# Patient Record
Sex: MALE | Race: BLACK OR AFRICAN AMERICAN | NOT HISPANIC OR LATINO
[De-identification: names, ages, dates, MRNs, and addresses within clinical notes are randomized per-mention and may not be internally consistent; named-entity substitution may affect disease eponyms.]

---

## 2023-03-10 ENCOUNTER — APPOINTMENT (OUTPATIENT)
Dept: VASCULAR SURGERY | Facility: CLINIC | Age: 48
End: 2023-03-10
Payer: COMMERCIAL

## 2023-03-10 VITALS — HEIGHT: 73 IN | BODY MASS INDEX: 23.86 KG/M2 | WEIGHT: 180 LBS

## 2023-03-10 DIAGNOSIS — Z72.89 OTHER PROBLEMS RELATED TO LIFESTYLE: ICD-10-CM

## 2023-03-10 DIAGNOSIS — J45.909 UNSPECIFIED ASTHMA, UNCOMPLICATED: ICD-10-CM

## 2023-03-10 DIAGNOSIS — I10 ESSENTIAL (PRIMARY) HYPERTENSION: ICD-10-CM

## 2023-03-10 DIAGNOSIS — Z82.49 FAMILY HISTORY OF ISCHEMIC HEART DISEASE AND OTHER DISEASES OF THE CIRCULATORY SYSTEM: ICD-10-CM

## 2023-03-10 PROBLEM — Z00.00 ENCOUNTER FOR PREVENTIVE HEALTH EXAMINATION: Status: ACTIVE | Noted: 2023-03-10

## 2023-03-10 PROCEDURE — 99204 OFFICE O/P NEW MOD 45 MIN: CPT

## 2023-03-10 RX ORDER — ALBUTEROL SULFATE 90 UG/1
108 (90 BASE) AEROSOL, METERED RESPIRATORY (INHALATION)
Refills: 0 | Status: ACTIVE | COMMUNITY

## 2023-03-10 RX ORDER — RAMIPRIL 2.5 MG/1
2.5 CAPSULE ORAL
Refills: 0 | Status: ACTIVE | COMMUNITY

## 2023-03-10 RX ORDER — MONTELUKAST 10 MG/1
TABLET, FILM COATED ORAL
Refills: 0 | Status: ACTIVE | COMMUNITY

## 2023-03-10 NOTE — ASSESSMENT
[FreeTextEntry1] : 47-year-old male with an incidental finding of a 4 cm ascending aortic aneurysm on coronary CT.  I discussed that ascending aortic aneurysms are managed by cardiothoracic surgery, and as such I have provided him a referral to Dr. Carroll Chance at [facility].  He will make a nonurgent appointment, and I did discuss with him that he will likely require surveillance, not operative repair at this juncture.\par Due to his recent diagnosis he has anxiety about abdominal aortic aneurysm disease.  As such, we may obtain a duplex to rule out AAA.  We will follow-up with me after duplex performed to discuss results as well as management.

## 2023-03-10 NOTE — DATA REVIEWED
[FreeTextEntry1] : Technique: Coronary calcium scoring was performed on a 64-slice cardiac CT\par scanner with prospective ECG gating technique.\par \par Radiation Dose Exposure:  DLP: 81.1 mGy.cm x 0.014 = Effective Dose 1.1 mSv\par \par Score Summary:\par \par Coronary Arteries\par \par Total: 0         \par    \par LM: 0         \par LAD: 0         \par LCx: 0        \par RCA: 0    \par \par Aortic calcium (including aortic sinus): N       \par Aortic valve calcium: N       \par Mitral annular calcium: N             \par Pericardial calcification: N              \par \par \par Non cardiac findings: \par \par The visualized lungs reveal no suspicious abnormalities.\par \par The visualized mediastinum reveals no suspicious abnormalities. There is mild\par fusiform dilatation of ascending thoracic aorta measuring 4 cm in diameter..\par \par \par IMPRESSION: \par \par Cardiac: \par The calcium score is 0 Agatston units.\par \par Non-cardiac: \par \par \par Mild 4 cm fusiform dilatation ascending thoracic aorta. Consider interval\par yearly follow-up surveillance imaging.\par \par \par Cardiovascular Disease (CVD) Risk Score Translation:\par 0: No calcified atherosclerotic plaque; very low CVD risk.\par 1:10: Minimal plaque burden; low CVD risk.\par 11:100: Mild plaque burden; moderate CVD risk.\par 101:400: Moderate plaque burden; high CVD risk.\par Greater than 401: Severe plaque burden; very high CVD risk\par \par Onel Cruz M.D., Attending Cardiologist\par Stevie Erickson M.D., Attending Radiologist\par \par --- End of Report ---\par \par 			Electronically Signed:\par 			____________________________________________\par 			Stevie Erickson MD\par 			02/15/23 1525

## 2023-03-10 NOTE — CONSULT LETTER
[Dear  ___] : Dear  [unfilled], [Consult Letter:] : I had the pleasure of evaluating your patient, [unfilled]. [Please see my note below.] : Please see my note below. [Consult Closing:] : Thank you very much for allowing me to participate in the care of this patient.  If you have any questions, please do not hesitate to contact me. [Sincerely,] : Sincerely, [FreeTextEntry3] : Sundeep Drummond MD, RPVI

## 2023-03-10 NOTE — PHYSICAL EXAM
[2+] : left 2+ [Ankle Swelling (On Exam)] : not present [Varicose Veins Of Lower Extremities] : not present [] : not present [No Rash or Lesion] : No rash or lesion [Alert] : alert [Oriented to Person] : oriented to person [Oriented to Place] : oriented to place [Calm] : calm [de-identified] : NAD [de-identified] : NCAT [de-identified] : Soft, nontender, nondistended [de-identified] : Supple

## 2023-03-10 NOTE — HISTORY OF PRESENT ILLNESS
[FreeTextEntry1] : 47-year-old male referred by YOSHI Kenney, for evaluation of incidental finding on recent coronary CT.\par He was undergoing a prior CT for screening purposes, which revealed a 4 cm ascending aortic aneurysm.  He denies a personal family history of aneurysmal disease or connective tissue disorder.  He is a lifelong non-smoker.\par He is in good health, remains active and exercises frequently.  He denies chest pain, back pain, abdominal pain.

## 2023-03-21 ENCOUNTER — APPOINTMENT (OUTPATIENT)
Dept: CARDIOTHORACIC SURGERY | Facility: CLINIC | Age: 48
End: 2023-03-21
Payer: COMMERCIAL

## 2023-03-21 ENCOUNTER — NON-APPOINTMENT (OUTPATIENT)
Age: 48
End: 2023-03-21

## 2023-03-21 VITALS
BODY MASS INDEX: 23.86 KG/M2 | RESPIRATION RATE: 16 BRPM | TEMPERATURE: 97.8 F | DIASTOLIC BLOOD PRESSURE: 77 MMHG | SYSTOLIC BLOOD PRESSURE: 136 MMHG | HEIGHT: 73 IN | WEIGHT: 180 LBS | HEART RATE: 54 BPM | OXYGEN SATURATION: 99 %

## 2023-03-21 DIAGNOSIS — I71.21 ANEURYSM OF THE ASCENDING AORTA, WITHOUT RUPTURE: ICD-10-CM

## 2023-03-21 DIAGNOSIS — Z82.49 FAMILY HISTORY OF ISCHEMIC HEART DISEASE AND OTHER DISEASES OF THE CIRCULATORY SYSTEM: ICD-10-CM

## 2023-03-21 PROCEDURE — 99204 OFFICE O/P NEW MOD 45 MIN: CPT

## 2023-03-22 PROBLEM — Z82.49 FAMILY HISTORY OF MYOCARDIAL INFARCTION: Status: ACTIVE | Noted: 2023-03-22

## 2023-03-22 PROBLEM — I71.21 ANEURYSM OF ASCENDING AORTA: Status: ACTIVE | Noted: 2023-03-10

## 2023-03-29 NOTE — HISTORY OF PRESENT ILLNESS
[FreeTextEntry1] : 47 year old male with a past medical hx of hypertension, asthma, presents for evaluation and management of ascending aortic aneurysm. \par \par CT calcium score coronary 2/15/2023:\par calcium score is 0 agatston units. \par mild 4 cm fusiform ascending thoracic aorta. \par \par Patient is doing well and denies recent hospitalization, ER visits, or surgeries. He  denies fever, chills, fatigue, headache, blurred vision, dizziness, syncope, chest pain, palpitations, shortness of breath, orthopnea, paroxysmal nocturnal dyspnea, nausea, vomiting, abdominal pain, back pain, BRBPR or swelling to legs. Patient exercises on a regular basis. \par

## 2023-03-29 NOTE — ASSESSMENT
[FreeTextEntry1] : 47 year old male with a past medical hx of hypertension, asthma, presents for evaluation and management of ascending aortic aneurysm. \par \par \par The patient's medical records and diagnostic images were reviewed at the time of this office visit, and the following recommendation was made. Patient does not meet criteria for surgical intervention at the time and will be entered into the aortic registry surveillance program.\par \par Plan\par \par - [facility] with [facility]A chest. \par - Continue medication regimen.\par - Follow up with cardiologist and PCP.\par - Blood pressure management.\par

## 2023-03-29 NOTE — PROCEDURE
[FreeTextEntry1] : Dr. Chance reviewed the indications for surgery, and used our webpage [email or web address] <[email or web address]> to illustrate the aorta and anatomy of the heart. Those indications are the following: size greater than 5.0 cm, symptomatic aneurysms, family history of aortic dissection or aneurysm death with a size greater than 4.5 cm, other necessary cardiac procedures such as coronary artery bypass grafting or valvular disorders with an aneurysm greater than 4.5 cm, or connective tissue disorders with an aneurysm size greater than 4.5 cm. The patient does not meet size criteria for surgical intervention at the time.\par \par Dr. Chance discussed activity restrictions with the patient, and would advise exercise at a moderate amount with no heavy lifting over one third of body weight, and avoiding heart rates that exceed 140 beats per minute. In addition, every patient should abstain from tobacco abuse and to avoid all illicit drug use, especially stimulants such as cocaine or methamphetamine. Dr. Chance also counseled regarding maintaining a healthy heart diet, and losing any excessive weight as this also put undue stress on both the aorta and entire cardiovascular system. First degree family members should be screened for bicuspid valve disease, and ascending aortic aneurysms. \par \par Patient was advised to view the educational video prior to this visit regarding aortic pathology, risk factors, surgical procedures, and lifestyle modifications. Video can be retrieved at [email or web address]/[email or web address]=youtu.be.\par

## 2023-03-29 NOTE — END OF VISIT
[Time Spent: ___ minutes] : I have spent [unfilled] minutes of time on the encounter. [FreeTextEntry3] : \par I, MAYANK JOU , am scribing for and in the presence of BRITTNEY CARTER the following sections: History of present illness, past Medical/family/surgical/family/social history, review of systems, vital signs, physical exam and disposition.\par \par I personally performed the services described in the documentation, reviewed the documentation recorded by the scribe in my presence and it accurately and completely records my words and actions.\par \par

## 2023-03-29 NOTE — CONSULT LETTER
[Dear  ___] : Dear  [unfilled], [FreeTextEntry2] : Sundeep Drummond MD [FreeTextEntry1] : I had the pleasure of seeing your patient, DIANA MANCERA , in my office today. We take a multidisciplinary team approach to patient care and consider you, the referring physician, an extension of our team. We will maintain an open line of communication with you throughout your patient's treatment course.  \par \par Mr. MANCERA presents for an initial evaluation and management of thoracic aortic aneurysm. I have reviewed all of his  medical records and diagnostic images at the time of his office consultation. I have enclosed a copy for your records. \par \par I have also reviewed the indications for surgery, and used our webpage <<[email or web address]>> to illustrate the aorta and anatomy of the heart. The patient does not meet size criteria for surgical intervention at the time. \par \par Therefore, I have recommended that the patient will require a follow up appointment in 2 year with CTA chest to monitor his aortic pathology. My office will assist the patient with his upcoming appointment and I will update you on his progress at that time.\par \par I have discussed with the patient that we will continue to monitor his  aortic pathology closely at the [facility] at [facility] that encompasses the entire health care system and is one of the largest in the [place] at this point.\par \par It is our commitment to provide your patient with the highest quality of advanced therapeutic options. On behalf of the Cardiothoracic Surgery Team, thank you for sending your patient to the [facility] based at [facility].  If there are any questions or concerns, please call me directly at [phone].  \par \par Please see my note below. \par \par Sincerely, \par \par \par \par \par Carroll Chance M.D.\par Professor of Cardiovascular and Thoracic Surgery\par Minimally Invasive Valve Surgeon\par Director of Aortic Surgery, [facility]\par Cell: [phone]\par Email: [email or web address] \par \par [facility]:\par [address], [address] Floor, [place] [address]\par Office: [phone]\par Fax: [phone]\par \par [facility]:\par Department of Cardiovascular and Thoracic Surgery\par [address] [place] [address], [address]\par Office: [phone]\par Fax: [phone]\par \par Practice Manager: Ms. Lu Cerda\[facility] Email: [email or web address] Phone: [phone]

## 2023-03-29 NOTE — PHYSICAL EXAM
[General Appearance - Alert] : alert [General Appearance - In No Acute Distress] : in no acute distress [Sclera] : the sclera and conjunctiva were normal [Outer Ear] : the ears and nose were normal in appearance [Neck Appearance] : the appearance of the neck was normal [] : no respiratory distress [Respiration, Rhythm And Depth] : normal respiratory rhythm and effort [Exaggerated Use Of Accessory Muscles For Inspiration] : no accessory muscle use [Auscultation Breath Sounds / Voice Sounds] : lungs were clear to auscultation bilaterally [Heart Rate And Rhythm] : heart rate was normal and rhythm regular [Heart Sounds] : normal S1 and S2 [Examination Of The Chest] : the chest was normal in appearance [2+] : left 2+ [No Abnormalities] : the abdominal aorta was not enlarged and no bruit was heard [Bowel Sounds] : normal bowel sounds [Abdomen Soft] : soft [No CVA Tenderness] : no ~M costovertebral angle tenderness [Abnormal Walk] : normal gait [Skin Color & Pigmentation] : normal skin color and pigmentation [No Focal Deficits] : no focal deficits [Oriented To Time, Place, And Person] : oriented to person, place, and time [FreeTextEntry1] : deferred

## 2023-05-05 ENCOUNTER — APPOINTMENT (OUTPATIENT)
Dept: VASCULAR SURGERY | Facility: CLINIC | Age: 48
End: 2023-05-05

## 2023-05-18 ENCOUNTER — APPOINTMENT (OUTPATIENT)
Dept: VASCULAR SURGERY | Facility: CLINIC | Age: 48
End: 2023-05-18